# Patient Record
Sex: MALE | Race: WHITE | NOT HISPANIC OR LATINO | URBAN - NONMETROPOLITAN AREA
[De-identification: names, ages, dates, MRNs, and addresses within clinical notes are randomized per-mention and may not be internally consistent; named-entity substitution may affect disease eponyms.]

---

## 2017-08-02 ENCOUNTER — APPOINTMENT (RX ONLY)
Dept: URBAN - NONMETROPOLITAN AREA CLINIC 26 | Facility: CLINIC | Age: 42
Setting detail: DERMATOLOGY
End: 2017-08-02

## 2017-08-02 DIAGNOSIS — L81.4 OTHER MELANIN HYPERPIGMENTATION: ICD-10-CM

## 2017-08-02 DIAGNOSIS — K13.0 DISEASES OF LIPS: ICD-10-CM

## 2017-08-02 PROCEDURE — ? COUNSELING

## 2017-08-02 PROCEDURE — 99201: CPT

## 2017-08-02 PROCEDURE — ? ADDITIONAL NOTES

## 2017-08-02 PROCEDURE — ? PRESCRIPTION

## 2017-08-02 RX ORDER — TRETINOIN 0.5 MG/G
CREAM TOPICAL
Qty: 1 | Refills: 3 | Status: ERX | COMMUNITY
Start: 2017-08-02

## 2017-08-02 RX ADMIN — TRETINOIN: 0.5 CREAM TOPICAL at 19:22

## 2017-08-02 ASSESSMENT — LOCATION SIMPLE DESCRIPTION DERM
LOCATION SIMPLE: RIGHT LIP
LOCATION SIMPLE: LEFT LIP
LOCATION SIMPLE: LEFT CHEEK

## 2017-08-02 ASSESSMENT — LOCATION DETAILED DESCRIPTION DERM
LOCATION DETAILED: LEFT INFERIOR VERMILION LIP
LOCATION DETAILED: LEFT INFERIOR CENTRAL MALAR CHEEK
LOCATION DETAILED: RIGHT INFERIOR VERMILION LIP

## 2017-08-02 ASSESSMENT — LOCATION ZONE DERM
LOCATION ZONE: FACE
LOCATION ZONE: LIP

## 2017-08-02 NOTE — PROCEDURE: ADDITIONAL NOTES
Additional Notes: CONCERN FOR EARLY ACTINIC CHEILITIS. WILL USE SUNSCREEN OF AT LEAST SPF 30 ON LIPS. IF WORSENS, SHE WILL CALL. PLAN FOR EFUDEX IF WORSENING.
Additional Notes: MULTIPLE LENTIGINES ON FACE INCLUDING LARGE LESION ON LEFT MALAR CHEEK. DISCUSSED TREATMENT OPTIONS INCLUDING IPL AND CRYOTHERAPY BUT DISCUSSED RISK OF HYPOPIGMENTATION. WILL TREAT WITH TOPICAL TRETINOIN. ADVISED OF IMPORTANCE OF SUN PROTECTION WITH ITS USE.
